# Patient Record
Sex: MALE | Race: WHITE | NOT HISPANIC OR LATINO | Employment: OTHER | URBAN - METROPOLITAN AREA
[De-identification: names, ages, dates, MRNs, and addresses within clinical notes are randomized per-mention and may not be internally consistent; named-entity substitution may affect disease eponyms.]

---

## 2019-10-11 ENCOUNTER — NURSE TRIAGE (OUTPATIENT)
Dept: ADMINISTRATIVE | Facility: CLINIC | Age: 84
End: 2019-10-11

## 2019-10-11 NOTE — TELEPHONE ENCOUNTER
Patient states he sees a round black [place] hollowed out in the center in his left eye. He states he had a procedure with a needle put in his left eye today and he has had this procedure 4 other times but has never experienced this before. Patient transferred to  to speak to on call Ophthalmologist. Please contact caller directly to discuss further care advice.    Reason for Disposition   [1] Blurred vision or visual changes AND [2] present now AND [3] sudden onset or new (e.g., minutes, hours, days)  (Exception: seeing floaters / black specks OR previously diagnosed migraine headaches with same symptoms)    Additional Information   Negative: Weakness of the face, arm or leg on one side of the body   Negative: Followed getting substance in the eye   Negative: Foreign body or object is or was lodged in the eye   Negative: Followed an eye injury   Negative: Followed sun lamp or sun exposure (UV keratitis)   Negative: Yellow or green discharge (pus) in the eye   Negative: Pregnant   Negative: Postpartum   Negative: Complete loss of vision in 1 or both eyes   Negative: SEVERE eye pain   Negative: Double vision   Negative: Severe headache    Protocols used: VISION LOSS OR CHANGE-A-AH

## 2022-10-11 PROBLEM — C43.62: Status: ACTIVE | Noted: 2022-10-11
